# Patient Record
Sex: FEMALE | Race: WHITE | NOT HISPANIC OR LATINO | Employment: FULL TIME | URBAN - METROPOLITAN AREA
[De-identification: names, ages, dates, MRNs, and addresses within clinical notes are randomized per-mention and may not be internally consistent; named-entity substitution may affect disease eponyms.]

---
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
## 2024-07-24 ENCOUNTER — HOSPITAL ENCOUNTER (EMERGENCY)
Facility: CLINIC | Age: 24
Discharge: HOME | End: 2024-07-24
Attending: EMERGENCY MEDICINE | Admitting: EMERGENCY MEDICINE
Payer: COMMERCIAL

## 2024-07-24 VITALS
SYSTOLIC BLOOD PRESSURE: 144 MMHG | HEART RATE: 73 BPM | DIASTOLIC BLOOD PRESSURE: 85 MMHG | OXYGEN SATURATION: 98 % | TEMPERATURE: 98 F | RESPIRATION RATE: 22 BRPM

## 2024-07-24 DIAGNOSIS — T78.40XA ALLERGIC REACTION, INITIAL ENCOUNTER: ICD-10-CM

## 2024-07-24 DIAGNOSIS — T78.2XXA ANAPHYLAXIS, INITIAL ENCOUNTER: Primary | ICD-10-CM

## 2024-07-24 PROCEDURE — 99285 EMERGENCY DEPT VISIT HI MDM: CPT | Mod: 25

## 2024-07-24 PROCEDURE — 250N000009 HC RX 250: Performed by: EMERGENCY MEDICINE

## 2024-07-24 PROCEDURE — 96374 THER/PROPH/DIAG INJ IV PUSH: CPT

## 2024-07-24 PROCEDURE — 250N000011 HC RX IP 250 OP 636: Performed by: EMERGENCY MEDICINE

## 2024-07-24 PROCEDURE — 96375 TX/PRO/DX INJ NEW DRUG ADDON: CPT

## 2024-07-24 RX ORDER — METHYLPREDNISOLONE SODIUM SUCCINATE 125 MG/2ML
125 INJECTION, POWDER, LYOPHILIZED, FOR SOLUTION INTRAMUSCULAR; INTRAVENOUS ONCE
Status: COMPLETED | OUTPATIENT
Start: 2024-07-24 | End: 2024-07-24

## 2024-07-24 RX ORDER — ONDANSETRON 2 MG/ML
INJECTION INTRAMUSCULAR; INTRAVENOUS
Status: DISCONTINUED
Start: 2024-07-24 | End: 2024-07-24 | Stop reason: HOSPADM

## 2024-07-24 RX ORDER — EPINEPHRINE 0.3 MG/.3ML
0.3 INJECTION SUBCUTANEOUS
Qty: 2 EACH | Refills: 0 | Status: SHIPPED | OUTPATIENT
Start: 2024-07-24

## 2024-07-24 RX ORDER — ONDANSETRON 4 MG/1
4 TABLET, ORALLY DISINTEGRATING ORAL EVERY 8 HOURS PRN
Qty: 10 TABLET | Refills: 0 | Status: SHIPPED | OUTPATIENT
Start: 2024-07-24

## 2024-07-24 RX ORDER — FAMOTIDINE 20 MG/1
20 TABLET, FILM COATED ORAL 2 TIMES DAILY
Qty: 14 TABLET | Refills: 0 | Status: SHIPPED | OUTPATIENT
Start: 2024-07-24 | End: 2024-07-31

## 2024-07-24 RX ORDER — PREDNISONE 20 MG/1
TABLET ORAL
Qty: 10 TABLET | Refills: 0 | Status: SHIPPED | OUTPATIENT
Start: 2024-07-24

## 2024-07-24 RX ORDER — ONDANSETRON 2 MG/ML
4 INJECTION INTRAMUSCULAR; INTRAVENOUS ONCE
Status: COMPLETED | OUTPATIENT
Start: 2024-07-24 | End: 2024-07-24

## 2024-07-24 RX ORDER — CETIRIZINE HYDROCHLORIDE 10 MG/1
10 TABLET ORAL 2 TIMES DAILY
Qty: 14 TABLET | Refills: 0 | Status: SHIPPED | OUTPATIENT
Start: 2024-07-24 | End: 2024-07-31

## 2024-07-24 RX ORDER — METHYLPREDNISOLONE SODIUM SUCCINATE 125 MG/2ML
INJECTION, POWDER, LYOPHILIZED, FOR SOLUTION INTRAMUSCULAR; INTRAVENOUS
Status: DISCONTINUED
Start: 2024-07-24 | End: 2024-07-24 | Stop reason: HOSPADM

## 2024-07-24 RX ADMIN — EPINEPHRINE 0.5 MG: 1 INJECTION INTRAMUSCULAR; INTRAVENOUS; SUBCUTANEOUS at 14:38

## 2024-07-24 RX ADMIN — ONDANSETRON 4 MG: 2 INJECTION INTRAMUSCULAR; INTRAVENOUS at 14:52

## 2024-07-24 RX ADMIN — FAMOTIDINE 20 MG: 10 INJECTION, SOLUTION INTRAVENOUS at 14:38

## 2024-07-24 RX ADMIN — METHYLPREDNISOLONE SODIUM SUCCINATE 125 MG: 125 INJECTION, POWDER, FOR SOLUTION INTRAMUSCULAR; INTRAVENOUS at 14:40

## 2024-07-24 ASSESSMENT — ACTIVITIES OF DAILY LIVING (ADL)
ADLS_ACUITY_SCORE: 35
ADLS_ACUITY_SCORE: 35

## 2024-07-24 ASSESSMENT — COLUMBIA-SUICIDE SEVERITY RATING SCALE - C-SSRS
2. HAVE YOU ACTUALLY HAD ANY THOUGHTS OF KILLING YOURSELF IN THE PAST MONTH?: NO
6. HAVE YOU EVER DONE ANYTHING, STARTED TO DO ANYTHING, OR PREPARED TO DO ANYTHING TO END YOUR LIFE?: NO
1. IN THE PAST MONTH, HAVE YOU WISHED YOU WERE DEAD OR WISHED YOU COULD GO TO SLEEP AND NOT WAKE UP?: NO

## 2024-07-24 NOTE — ED PROVIDER NOTES
Emergency Department Note      History of Present Illness     Chief Complaint   Allergic Reaction    HPI   Donte Rdz is a 23 year old female who presents with an allergic reaction that started around 1300. She ate a salad that contained chickpeas, chicken, and sweet potato. She believes there was something in the salad that she is allergic to, noting that she is allergic to tree nuts and peanuts. She is experiencing nausea and dry heaving, which is similar to past episode of anaphylaxis. Onset  of facial swelling, nasal congestion, and facial redness was immediate after eating the salad. She took 4 benadryl at 1305 to manage her symptoms, which she initially attributed to anxiety. She did not take a dose of her epipen, but notes she had it with her. Denies vomiting. No current steroid use. Of note, Donte is also allergic to penicillin.     Independent Historian   None    Review of External Notes   None    Past Medical History     Medical History and Problem List   The patient denies any significant past medical history.     Medications   Epinephrine   Famotidine   Albuterol     Physical Exam     Patient Vitals for the past 24 hrs:   BP Temp Temp src Pulse Resp SpO2   07/24/24 1602 -- -- -- -- 22 98 %   07/24/24 1600 (!) 144/85 -- -- 73 -- --   07/24/24 1544 -- -- -- -- 12 99 %   07/24/24 1543 121/67 -- -- 74 -- --   07/24/24 1538 -- -- -- 71 16 97 %   07/24/24 1443 -- 98  F (36.7  C) Temporal -- -- --   07/24/24 1438 -- -- -- 63 17 99 %   07/24/24 1433 -- -- -- 79 23 100 %   07/24/24 1432 126/84 -- -- 74 -- --     Physical Exam  General: Alert, appears well-developed and well-nourished. Cooperative.     In mild distress, facial flushing  HEENT:  Head:  Atraumatic, faint facial swelling.    Ears:  External ears are normal  Mouth/Throat:  Oropharynx is without erythema or exudate and mucous membranes are moist.  No uvular edema.     Eyes:   Conjunctivae normal and EOM are normal. No scleral icterus.    Pupils  are equal, round, and reactive to light.   CV:  Normal rate, regular rhythm, normal heart sounds and radial pulses are 2+ and symmetric.  No murmur.  Resp:  Breath sounds are clear bilaterally    Non-labored, no retractions or accessory muscle use  GI:  Abdomen is soft, no distension, no tenderness. No rebound or guarding.  No CVA tenderness bilaterally  MS:  Normal range of motion. No edema.    Normal strength in all 4 extremities.     Back atraumatic.    No midline cervical, thoracic, or lumbar tenderness  Skin:  Warm and dry.  There is erythema to the upper cheeks bilaterally.    Neuro:   Alert. Normal strength.  GCS: 15  Psych: Normal mood and affect.    Diagnostics     Lab Results   Labs Ordered and Resulted from Time of ED Arrival to Time of ED Departure - No data to display    Imaging   No orders to display     Independent Interpretation   None    ED Course      Medications Administered   Medications   famotidine (PEPCID) injection 20 mg (20 mg Intravenous $Given 7/24/24 1438)   methylPREDNISolone sodium succinate (solu-MEDROL) injection 125 mg (125 mg Intravenous $Given 7/24/24 1440)   EPINEPHrine (ADRENALIN) kit 0.3-0.5 mg (0.5 mg Intramuscular $Given 7/24/24 1438)   ondansetron (ZOFRAN) injection 4 mg (4 mg Intravenous $Given 7/24/24 1452)       Procedures   Procedures     Discussion of Management   None    ED Course   ED Course as of 07/24/24 1648   Wed Jul 24, 2024   1429 I obtained the history and examined the patient as noted above.      1455 I rechecked and updated the patient.          Optional/Additional Documentation  None    Medical Decision Making / Diagnosis     CMS Diagnoses: None    MIPS       None    MDM   Donte Rdz is a 23 year old female who presents for evaluation of facial swelling and redness after salad ingestion this afternoon.  Signs and symptoms are consistent with anaphylaxis. The patient looks well at discharge and symptoms have stabilized and improved. We did watch here for 2  hours prior to considering discharge. Donte was treated here with medications as noted above. Will send home with epipen, steroids, antihistamines.  Return of anaphylactic symptoms were discussed with patient and they were instructed to inject epi-pen and call 911 should these symptoms occur.  Given the rapidity of resolution, lack of serious systemic symptoms, lack of respiratory difficulty and no oral or pharyngeal swelling, would not admit at this time for anaphylaxis. There is no signs of anaphylactic shock.      Critical Care time was 30 minutes for this patient excluding procedures.    Disposition   The patient was discharged.     Diagnosis     ICD-10-CM    1. Anaphylaxis, initial encounter  T78.2XXA       2. Allergic reaction, initial encounter  T78.40XA          Discharge Medications   Discharge Medication List as of 7/24/2024  4:06 PM        START taking these medications    Details   cetirizine (ZYRTEC) 10 MG tablet Take 1 tablet (10 mg) by mouth 2 times daily for 7 days, Disp-14 tablet, R-0, E-Prescribe      EPINEPHrine (ANY BX GENERIC EQUIV) 0.3 MG/0.3ML injection 2-pack Inject 0.3 mLs (0.3 mg) into the muscle once as needed for anaphylaxis May repeat one time in 5-15 minutes if response to initial dose is inadequate., Disp-2 each, R-0, E-Prescribe      famotidine (PEPCID) 20 MG tablet Take 1 tablet (20 mg) by mouth 2 times daily for 7 days, Disp-14 tablet, R-0, E-Prescribe      ondansetron (ZOFRAN ODT) 4 MG ODT tab Take 1 tablet (4 mg) by mouth every 8 hours as needed for nausea or vomiting, Disp-10 tablet, R-0, E-Prescribe      predniSONE (DELTASONE) 20 MG tablet Take two tablets (= 40mg) each day for 5 (five) days, Disp-10 tablet, R-0, E-Prescribe           Scribe Disclosure:  I, Alissa Cannon, am serving as a scribe at 2:43 PM on 7/24/2024 to document services personally performed by John Rosas MD based on my observations and the provider's statements to me.        John Rosas,  MD  07/24/24 1563

## 2025-01-09 ENCOUNTER — LAB REQUISITION (OUTPATIENT)
Dept: LAB | Facility: CLINIC | Age: 25
End: 2025-01-09
Payer: COMMERCIAL

## 2025-01-09 DIAGNOSIS — L08.9 LOCAL INFECTION OF THE SKIN AND SUBCUTANEOUS TISSUE, UNSPECIFIED: ICD-10-CM

## 2025-01-09 PROCEDURE — 87070 CULTURE OTHR SPECIMN AEROBIC: CPT | Mod: ORL | Performed by: DERMATOLOGY

## 2025-01-12 LAB
BACTERIA WND CULT: ABNORMAL
BACTERIA WND CULT: ABNORMAL